# Patient Record
Sex: FEMALE | Race: WHITE | NOT HISPANIC OR LATINO | URBAN - METROPOLITAN AREA
[De-identification: names, ages, dates, MRNs, and addresses within clinical notes are randomized per-mention and may not be internally consistent; named-entity substitution may affect disease eponyms.]

---

## 2017-01-03 ENCOUNTER — HISTORICAL (OUTPATIENT)
Dept: LAB | Facility: HOSPITAL | Age: 32
End: 2017-01-03

## 2017-01-25 ENCOUNTER — HISTORICAL (OUTPATIENT)
Dept: OBSTETRICS AND GYNECOLOGY | Facility: HOSPITAL | Age: 32
End: 2017-01-25

## 2017-07-26 ENCOUNTER — HISTORICAL (OUTPATIENT)
Dept: RADIOLOGY | Facility: HOSPITAL | Age: 32
End: 2017-07-26

## 2018-04-12 ENCOUNTER — HISTORICAL (OUTPATIENT)
Dept: ADMINISTRATIVE | Facility: HOSPITAL | Age: 33
End: 2018-04-12

## 2018-04-12 LAB
ABS NEUT (OLG): 3.3
ALBUMIN SERPL-MCNC: 4.4 GM/DL (ref 3.4–5)
ALBUMIN/GLOB SERPL: 1.57 {RATIO} (ref 1.5–2.5)
ALP SERPL-CCNC: 27 UNIT/L (ref 38–126)
ALT SERPL-CCNC: 11 UNIT/L (ref 7–52)
AST SERPL-CCNC: 18 UNIT/L (ref 15–37)
BILIRUB SERPL-MCNC: 0.5 MG/DL (ref 0.2–1)
BILIRUBIN DIRECT+TOT PNL SERPL-MCNC: 0.1 MG/DL (ref 0–0.5)
BUN SERPL-MCNC: 14 MG/DL (ref 7–18)
CALCIUM SERPL-MCNC: 8.6 MG/DL (ref 8.5–10)
CHLORIDE SERPL-SCNC: 109 MMOL/L (ref 98–107)
CHOLEST SERPL-MCNC: 201 MG/DL (ref 0–200)
CHOLEST/HDLC SERPL: 3.7 {RATIO}
CO2 SERPL-SCNC: 25 MMOL/L (ref 21–32)
CREAT SERPL-MCNC: 0.89 MG/DL (ref 0.6–1.3)
CREAT/UREA NIT SERPL: 15.7
ERYTHROCYTE [DISTWIDTH] IN BLOOD BY AUTOMATED COUNT: 11.7 % (ref 11.5–17)
GGT SERPL-CCNC: 8 UNIT/L (ref 5–85)
GLOBULIN SER-MCNC: 2.8 GM/DL (ref 1.2–3)
GLUCOSE SERPL-MCNC: 82 MG/DL (ref 74–106)
HCT VFR BLD AUTO: 43.3 % (ref 37–47)
HDLC SERPL-MCNC: 54 MG/DL (ref 35–60)
HGB BLD-MCNC: 14.7 GM/DL (ref 12–16)
LDH SERPL-CCNC: 152 UNIT/L (ref 140–271)
LDLC SERPL CALC-MCNC: 125 MG/DL (ref 0–129)
LYMPHOCYTES # BLD AUTO: 1.8 X10(3)/MCL (ref 0.6–3.4)
LYMPHOCYTES NFR BLD AUTO: 33.6 % (ref 13–40)
MCH RBC QN AUTO: 29.9 PG (ref 27–31.2)
MCHC RBC AUTO-ENTMCNC: 34 GM/DL (ref 32–36)
MCV RBC AUTO: 88 FL (ref 80–94)
MONOCYTES # BLD AUTO: 0.4 X10(3)/MCL (ref 0–1.8)
MONOCYTES NFR BLD AUTO: 6.4 % (ref 0.1–24)
NEUTROPHILS NFR BLD AUTO: 60 % (ref 47–80)
PLATELET # BLD AUTO: 172 X10(3)/MCL (ref 130–400)
PMV BLD AUTO: 9.6 FL
POTASSIUM SERPL-SCNC: 4.8 MMOL/L (ref 3.5–5.1)
PROT SERPL-MCNC: 7.2 GM/DL (ref 6.4–8.2)
RBC # BLD AUTO: 4.92 X10(6)/MCL (ref 4.2–5.4)
SODIUM SERPL-SCNC: 140 MMOL/L (ref 136–145)
TRIGL SERPL-MCNC: 41 MG/DL (ref 30–150)
VLDLC SERPL CALC-MCNC: 8.2 MG/DL
WBC # SPEC AUTO: 5.5 X10(3)/MCL (ref 4.5–11.5)

## 2019-02-12 ENCOUNTER — HISTORICAL (OUTPATIENT)
Dept: ADMINISTRATIVE | Facility: HOSPITAL | Age: 34
End: 2019-02-12

## 2019-02-12 LAB
ERYTHROCYTE [DISTWIDTH] IN BLOOD BY AUTOMATED COUNT: 12.1 % (ref 11.5–17)
HCT VFR BLD AUTO: 40 % (ref 37–47)
HGB BLD-MCNC: 13 GM/DL (ref 12–16)
MCH RBC QN AUTO: 28.2 PG (ref 27–31)
MCHC RBC AUTO-ENTMCNC: 32.5 GM/DL (ref 33–36)
MCV RBC AUTO: 86.8 FL (ref 80–94)
PLATELET # BLD AUTO: 191 X10(3)/MCL (ref 130–400)
PMV BLD AUTO: 10.3 FL (ref 9.4–12.4)
RBC # BLD AUTO: 4.61 X10(6)/MCL (ref 4.2–5.4)
WBC # SPEC AUTO: 8.1 X10(3)/MCL (ref 4.5–11.5)

## 2019-03-22 ENCOUNTER — HISTORICAL (OUTPATIENT)
Dept: LAB | Facility: HOSPITAL | Age: 34
End: 2019-03-22

## 2019-03-25 LAB — FINAL CULTURE: NO GROWTH

## 2019-04-04 ENCOUNTER — HISTORICAL (OUTPATIENT)
Dept: RADIOLOGY | Facility: HOSPITAL | Age: 34
End: 2019-04-04

## 2019-04-05 ENCOUNTER — HISTORICAL (OUTPATIENT)
Dept: RADIOLOGY | Facility: HOSPITAL | Age: 34
End: 2019-04-05

## 2019-04-05 LAB — POC CREATININE: 0.7 MG/DL (ref 0.6–1.3)

## 2019-04-10 ENCOUNTER — HISTORICAL (OUTPATIENT)
Dept: ADMINISTRATIVE | Facility: HOSPITAL | Age: 34
End: 2019-04-10

## 2019-04-10 LAB — GRAM STN SPEC: NORMAL

## 2019-04-13 LAB — FINAL CULTURE: NORMAL

## 2019-04-18 ENCOUNTER — HISTORICAL (OUTPATIENT)
Dept: ADMINISTRATIVE | Facility: HOSPITAL | Age: 34
End: 2019-04-18

## 2019-04-18 LAB
ABS NEUT (OLG): 5.4 X10(3)/MCL (ref 2.1–9.2)
ALBUMIN SERPL-MCNC: 3.8 GM/DL (ref 3.4–5)
ALBUMIN/GLOB SERPL: 1.41 {RATIO} (ref 1.5–2.5)
ALP SERPL-CCNC: 39 UNIT/L (ref 38–126)
ALT SERPL-CCNC: 14 UNIT/L (ref 7–52)
AST SERPL-CCNC: 18 UNIT/L (ref 15–37)
BILIRUB SERPL-MCNC: 0.4 MG/DL (ref 0.2–1)
BILIRUBIN DIRECT+TOT PNL SERPL-MCNC: 0.1 MG/DL (ref 0–0.5)
BILIRUBIN DIRECT+TOT PNL SERPL-MCNC: 0.3 MG/DL
BUN SERPL-MCNC: 11 MG/DL (ref 7–18)
CALCIUM SERPL-MCNC: 8.3 MG/DL (ref 8.5–10)
CHLORIDE SERPL-SCNC: 109 MMOL/L (ref 98–107)
CHOLEST SERPL-MCNC: 171 MG/DL (ref 0–200)
CHOLEST/HDLC SERPL: 4 {RATIO}
CO2 SERPL-SCNC: 28 MMOL/L (ref 21–32)
CREAT SERPL-MCNC: 0.7 MG/DL (ref 0.6–1.3)
ERYTHROCYTE [DISTWIDTH] IN BLOOD BY AUTOMATED COUNT: 11.5 % (ref 11.5–17)
GLOBULIN SER-MCNC: 2.6 GM/DL (ref 1.2–3)
GLUCOSE SERPL-MCNC: 87 MG/DL (ref 74–106)
HCT VFR BLD AUTO: 37.1 % (ref 37–47)
HDLC SERPL-MCNC: 43 MG/DL (ref 35–60)
HGB BLD-MCNC: 12.6 GM/DL (ref 12–16)
LDLC SERPL CALC-MCNC: 109 MG/DL (ref 0–129)
LYMPHOCYTES # BLD AUTO: 1.6 X10(3)/MCL (ref 0.6–3.4)
LYMPHOCYTES NFR BLD AUTO: 21.2 % (ref 13–40)
MCH RBC QN AUTO: 29 PG (ref 27–31.2)
MCHC RBC AUTO-ENTMCNC: 34 GM/DL (ref 32–36)
MCV RBC AUTO: 86 FL (ref 80–94)
MONOCYTES # BLD AUTO: 0.4 X10(3)/MCL (ref 0.1–1.3)
MONOCYTES NFR BLD AUTO: 5.6 % (ref 0.1–24)
NEUTROPHILS NFR BLD AUTO: 73.2 % (ref 47–80)
PLATELET # BLD AUTO: 249 X10(3)/MCL (ref 130–400)
PMV BLD AUTO: 9.3 FL (ref 9.4–12.4)
POTASSIUM SERPL-SCNC: 4.4 MMOL/L (ref 3.5–5.1)
PROT SERPL-MCNC: 6.5 GM/DL (ref 6.4–8.2)
RBC # BLD AUTO: 4.34 X10(6)/MCL (ref 4.2–5.4)
SODIUM SERPL-SCNC: 139 MMOL/L (ref 136–145)
TRIGL SERPL-MCNC: 51 MG/DL (ref 30–150)
VLDLC SERPL CALC-MCNC: 10.2 MG/DL
WBC # SPEC AUTO: 7.4 X10(3)/MCL (ref 4.5–11.5)

## 2019-06-20 ENCOUNTER — HISTORICAL (OUTPATIENT)
Dept: RADIOLOGY | Facility: HOSPITAL | Age: 34
End: 2019-06-20

## 2020-05-18 ENCOUNTER — HISTORICAL (OUTPATIENT)
Dept: LAB | Facility: HOSPITAL | Age: 35
End: 2020-05-18

## 2020-07-14 ENCOUNTER — HISTORICAL (OUTPATIENT)
Dept: LAB | Facility: HOSPITAL | Age: 35
End: 2020-07-14

## 2020-07-14 LAB — SARS-COV-2 RNA RESP QL NAA+PROBE: NOT DETECTED

## 2020-08-05 ENCOUNTER — HISTORICAL (OUTPATIENT)
Dept: ADMINISTRATIVE | Facility: HOSPITAL | Age: 35
End: 2020-08-05

## 2020-08-05 LAB
ABS NEUT (OLG): 4.1 X10(3)/MCL (ref 2.1–9.2)
ALBUMIN SERPL-MCNC: 4.6 GM/DL (ref 3.4–5)
ALBUMIN/GLOB SERPL: 1.84 {RATIO} (ref 1.5–2.5)
ALP SERPL-CCNC: 30 UNIT/L (ref 38–126)
ALT SERPL-CCNC: 14 UNIT/L (ref 7–52)
AST SERPL-CCNC: 19 UNIT/L (ref 15–37)
BILIRUB SERPL-MCNC: 0.4 MG/DL (ref 0.2–1)
BILIRUBIN DIRECT+TOT PNL SERPL-MCNC: 0.1 MG/DL (ref 0–0.5)
BILIRUBIN DIRECT+TOT PNL SERPL-MCNC: 0.3 MG/DL
BUN SERPL-MCNC: 16 MG/DL (ref 7–18)
CALCIUM SERPL-MCNC: 9.3 MG/DL (ref 8.5–10)
CHLORIDE SERPL-SCNC: 104 MMOL/L (ref 98–107)
CHOLEST SERPL-MCNC: 193 MG/DL (ref 0–200)
CHOLEST/HDLC SERPL: 3.2 {RATIO}
CO2 SERPL-SCNC: 30 MMOL/L (ref 21–32)
CREAT SERPL-MCNC: 0.71 MG/DL (ref 0.6–1.3)
ERYTHROCYTE [DISTWIDTH] IN BLOOD BY AUTOMATED COUNT: 11.3 % (ref 11.5–17)
GLOBULIN SER-MCNC: 2.5 GM/DL (ref 1.2–3)
GLUCOSE SERPL-MCNC: 87 MG/DL (ref 74–106)
HCT VFR BLD AUTO: 39.9 % (ref 37–47)
HDLC SERPL-MCNC: 60 MG/DL (ref 35–60)
HGB BLD-MCNC: 13.5 GM/DL (ref 12–16)
LDLC SERPL CALC-MCNC: 119 MG/DL (ref 0–129)
LYMPHOCYTES # BLD AUTO: 1.9 X10(3)/MCL (ref 0.6–3.4)
LYMPHOCYTES NFR BLD AUTO: 29.9 % (ref 13–40)
MCH RBC QN AUTO: 29.1 PG (ref 27–31.2)
MCHC RBC AUTO-ENTMCNC: 34 GM/DL (ref 32–36)
MCV RBC AUTO: 86 FL (ref 80–94)
MONOCYTES # BLD AUTO: 0.3 X10(3)/MCL (ref 0.1–1.3)
MONOCYTES NFR BLD AUTO: 5.1 % (ref 0.1–24)
NEUTROPHILS NFR BLD AUTO: 65 % (ref 47–80)
PLATELET # BLD AUTO: 207 X10(3)/MCL (ref 130–400)
PMV BLD AUTO: 10.2 FL (ref 9.4–12.4)
POTASSIUM SERPL-SCNC: 4.2 MMOL/L (ref 3.5–5.1)
PROT SERPL-MCNC: 7.1 GM/DL (ref 6.4–8.2)
RBC # BLD AUTO: 4.64 X10(6)/MCL (ref 4.2–5.4)
SODIUM SERPL-SCNC: 140 MMOL/L (ref 136–145)
TRIGL SERPL-MCNC: 81 MG/DL (ref 30–150)
TSH SERPL-ACNC: 1.44 MIU/ML (ref 0.35–4.94)
VLDLC SERPL CALC-MCNC: 16.2 MG/DL
WBC # SPEC AUTO: 6.3 X10(3)/MCL (ref 4.5–11.5)

## 2020-09-03 ENCOUNTER — HISTORICAL (OUTPATIENT)
Dept: LAB | Facility: HOSPITAL | Age: 35
End: 2020-09-03

## 2020-09-03 LAB — SARS-COV-2 RNA RESP QL NAA+PROBE: NOT DETECTED

## 2020-12-29 ENCOUNTER — HISTORICAL (OUTPATIENT)
Dept: LAB | Facility: HOSPITAL | Age: 35
End: 2020-12-29

## 2020-12-29 LAB — SARS-COV-2 RNA RESP QL NAA+PROBE: NOT DETECTED

## 2021-08-08 ENCOUNTER — HISTORICAL (OUTPATIENT)
Dept: LAB | Facility: HOSPITAL | Age: 36
End: 2021-08-08

## 2021-08-08 LAB — SARS-COV-2 RNA RESP QL NAA+PROBE: NOT DETECTED

## 2021-08-31 ENCOUNTER — HISTORICAL (OUTPATIENT)
Dept: ADMINISTRATIVE | Facility: HOSPITAL | Age: 36
End: 2021-08-31

## 2021-08-31 LAB
ABS NEUT (OLG): 4.1 X10(3)/MCL (ref 2.1–9.2)
ALBUMIN SERPL-MCNC: 4.3 GM/DL (ref 3.4–5)
ALBUMIN/GLOB SERPL: 1.87 {RATIO} (ref 1.5–2.5)
ALP SERPL-CCNC: 22 UNIT/L (ref 38–126)
ALT SERPL-CCNC: 11 UNIT/L (ref 7–52)
AST SERPL-CCNC: 17 UNIT/L (ref 15–37)
BILIRUB SERPL-MCNC: 0.4 MG/DL (ref 0.2–1)
BILIRUBIN DIRECT+TOT PNL SERPL-MCNC: 0.1 MG/DL (ref 0–0.5)
BILIRUBIN DIRECT+TOT PNL SERPL-MCNC: 0.3 MG/DL
BUN SERPL-MCNC: 14 MG/DL (ref 7–18)
CALCIUM SERPL-MCNC: 9 MG/DL (ref 8.5–10)
CHLORIDE SERPL-SCNC: 105 MMOL/L (ref 98–107)
CHOLEST SERPL-MCNC: 183 MG/DL (ref 0–200)
CHOLEST/HDLC SERPL: 3.8 {RATIO}
CO2 SERPL-SCNC: 31 MMOL/L (ref 21–32)
CREAT SERPL-MCNC: 0.74 MG/DL (ref 0.6–1.3)
ERYTHROCYTE [DISTWIDTH] IN BLOOD BY AUTOMATED COUNT: 11.5 % (ref 11.5–17)
GLOBULIN SER-MCNC: 2.3 GM/DL (ref 1.2–3)
GLUCOSE SERPL-MCNC: 88 MG/DL (ref 74–106)
HCT VFR BLD AUTO: 40 % (ref 37–47)
HDLC SERPL-MCNC: 48 MG/DL (ref 35–60)
HGB BLD-MCNC: 13.5 GM/DL (ref 12–16)
LDLC SERPL CALC-MCNC: 109 MG/DL (ref 0–129)
LYMPHOCYTES # BLD AUTO: 1.5 X10(3)/MCL (ref 0.6–3.4)
LYMPHOCYTES NFR BLD AUTO: 25.4 % (ref 13–40)
MCH RBC QN AUTO: 29.1 PG (ref 27–31.2)
MCHC RBC AUTO-ENTMCNC: 34 GM/DL (ref 32–36)
MCV RBC AUTO: 86 FL (ref 80–94)
MONOCYTES # BLD AUTO: 0.3 X10(3)/MCL (ref 0.1–1.3)
MONOCYTES NFR BLD AUTO: 4.7 % (ref 0.1–24)
NEUTROPHILS NFR BLD AUTO: 69.9 % (ref 47–80)
PLATELET # BLD AUTO: 207 X10(3)/MCL (ref 130–400)
PMV BLD AUTO: 10.1 FL (ref 9.4–12.4)
POTASSIUM SERPL-SCNC: 4.5 MMOL/L (ref 3.5–5.1)
PROT SERPL-MCNC: 6.6 GM/DL (ref 6.4–8.2)
RBC # BLD AUTO: 4.64 X10(6)/MCL (ref 4.2–5.4)
SODIUM SERPL-SCNC: 140 MMOL/L (ref 136–145)
TRIGL SERPL-MCNC: 86 MG/DL (ref 30–150)
TSH SERPL-ACNC: 1.45 MIU/ML (ref 0.35–4.94)
VLDLC SERPL CALC-MCNC: 17.2 MG/DL
WBC # SPEC AUTO: 5.9 X10(3)/MCL (ref 4.5–11.5)

## 2022-04-10 ENCOUNTER — HISTORICAL (OUTPATIENT)
Dept: ADMINISTRATIVE | Facility: HOSPITAL | Age: 37
End: 2022-04-10

## 2022-04-26 VITALS
WEIGHT: 157.63 LBS | DIASTOLIC BLOOD PRESSURE: 58 MMHG | HEIGHT: 63 IN | BODY MASS INDEX: 27.93 KG/M2 | SYSTOLIC BLOOD PRESSURE: 106 MMHG

## 2022-05-03 NOTE — HISTORICAL [facility]ERNER
This is a historical note converted from [place]. Formatting and pictures may have been removed.  Please reference [employer] for original formatting and attached multimedia. Chief Complaint  CPX  History of Present Illness  Had a negative COVID swab?a few weeks ago. ?Had a negative COVID antibody test in May.  biking/walking daily  diet is pretty good  up to date with gyn dr carbajal  Review of Systems  ?14 point Review of Systems performed with no exceptions for new complaints other than as noted in HPI.  Physical Exam  Vitals & Measurements  HR:?68(Peripheral)? BP:?102/72?  HT:?160.02?cm? HT:?160.02?cm? WT:?70.300?kg? WT:?70.3?kg? BMI:?27.45?  ?  PHYSICAL EXAM  ?   WDWN patient in NAD, ?AFVSS  HEENT - no acute abnormality  ??????????????? oropharynx WNL  HEART - RRR  LUNGS -? CTA  ABDOMEN - benign, NTND;? no peritoneal signs  EXTREMITIES - No CCE  SKIN - warm, dry, intact  PSYCH - affect appropriate;? alert and oriented  NEURO- no new deficits noted;? cranial nerves grossly intact  ?  Assessment/Plan  1.?Wellness examination?Z00.00  ?continue to work on diet/ exercise   Problem List/Past Medical History  Ongoing  Seasonal allergies  Wellness examination  Historical  Pregnant  Pregnant  Procedure/Surgical History  Adjacent tissue transfer or rearrangement, trunk; defect 10 sq cm or less (04/10/2019)  Excision Mass/Cyst (Right) (04/10/2019)  Excision of cyst, fibroadenoma, or other benign or malignant tumor, aberrant breast tissue, duct lesion, nipple or areolar lesion (except 71794), open, male or female, 1 or more lesions (04/10/2019)  Excision of Right Breast, Open Approach (04/10/2019)  Transfer Chest Subcutaneous Tissue and Fascia, Open Approach (04/10/2019)  Delivery of Products of Conception, External Approach (01/22/2017)  Drainage of Amniotic Fluid, Therapeutic from Products of Conception, Via Natural or Artificial Opening (01/22/2017)  Episiotomy (01/08/2015)  Other manually assisted delivery  (01/08/2015)  WISDOM TEETH (2004)   Medications  No active medications  Allergies  No Known Allergies  Social History  Abuse/Neglect  No, 08/05/2020  Alcohol - Denies Alcohol Use, 01/08/2015  Never, 01/22/2017  Employment/School - High Risk, 01/08/2015  Employed, 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 Living situation: Home/Independent., 04/11/2018  Substance Use - Denies Substance Abuse, 01/08/2015  Never, 01/22/2017  Tobacco - Denies Tobacco Use, 01/08/2015  Never (less than 100 in lifetime), N/A, 08/05/2020  Never (less than 100 in lifetime), N/A, 04/10/2019  Never smoker, 01/22/2017  Family History  Headache: Father.  Seasonal allergic rhinitis: Mother.  Immunizations  Vaccine Date Status Comments   tetanus/diphtheria/pertussis, acel(Tdap) 2017 Recorded    tetanus/diphtheria/pertussis, acel(Tdap) - Not Given Patient Refuses  recieved during pregnancy.   hepatitis B pediatric vaccine 08/27/2002 Recorded    hepatitis B pediatric vaccine 06/25/2002 Recorded    hepatitis B pediatric vaccine 04/10/2002 Recorded    meningococcal conjugate vaccine 01/18/2002 Recorded    poliovirus vaccine, live, trivalent 02/06/1992 Recorded    measles/mumps/rubella virus vaccine 02/06/1992 Recorded    poliovirus vaccine, live, trivalent 04/14/1988 Recorded    measles/mumps/rubella virus vaccine 04/14/1988 Recorded    poliovirus vaccine, live, trivalent 01/27/1987 Recorded    poliovirus vaccine, live, trivalent 1986 Recorded    Health Maintenance  Health Maintenance  ???Pending?(in the next year)  ??? ??OverDue  ??? ? ? ?Alcohol Misuse Screening due??01/02/20??and every 1??year(s)  ??? ??Due?  ??? ? ? ?ADL Screening due??08/05/20??and every 1??year(s)  ??? ? ? ?Depression Screening due??08/05/20??and every?  ??? ? ? ?Influenza Vaccine due??08/05/20??and every?  ??? ??Near Due?  ??? ? ? ?TB Skin Test near due??08/22/20??and every 1??year(s)  ??? ??Due In Future?  ??? ?  ? ?Obesity Screening not due until??01/01/21??and every 1??year(s)  ???Satisfied?(in the past 1 year)  ??? ??Satisfied?  ??? ? ? ?Blood Pressure Screening on??08/05/20.??Satisfied by Leeanne Calderon MA  ??? ? ? ?Body Mass Index Check on??08/05/20.??Satisfied by Leeanne Calderon MA  ??? ? ? ?Cervical Cancer Screening on??09/03/19.??Satisfied by cleveland Sifuentes  ??? ? ? ?Obesity Screening on??08/05/20.??Satisfied by Leeanne Calderon MA  ??? ? ? ?TB Skin Test on??08/22/19.  ?

## 2022-05-03 NOTE — HISTORICAL [facility]
This is a historical note converted from [employer]. Formatting and pictures may have been removed.  Please reference [employer] for original formatting and attached multimedia. Chief Complaint  CPX  History of Present Illness  dx with arachnoid cyst? after an isolated excruciating headache in august 2015;  redid a scan last year and no change;  Review of Systems  No new complaints except as explained HPI  ?  Physical Exam  Vitals & Measurements  HR:?80(Peripheral)? BP:?108/68?  HT:?160.02?cm? HT:?160.02?cm? WT:?66.67?kg? WT:?66.67?kg? BMI:?26.04?  ?  PHYSICAL EXAM  ?   WDWN patient in NAD, ?AFVSS  HEENT - no acute abnormality  ??????????????? oropharynx WNL  HEART - RRR  LUNGS -? CTA  ABDOMEN - benign, NTND;? no peritoneal signs  EXTREMITIES - No CCE  SKIN - warm, dry, intact  PSYCH - affect appropriate;? alert and oriented  NEURO- no new deficits noted;? cranial nerves grossly intact  ?  Assessment/Plan  1.?Wellness examination  up to date  Ordered:  CBC w/ Auto Diff, Routine collect, 04/12/18 9:56:00 CDT, Blood, Order for future visit, Stop date 04/12/18 9:56:00 CDT, Lab Collect, Wellness examination  Seasonal allergies, 04/12/18 9:56:00 CDT  Clinic Follow up, *Est. 04/12/19 3:00:00 CDT, PLEASE MAKE THIS A 30 MINUTE PHYSICAL, Order for future visit, Wellness examination  Seasonal allergies, [facility]  CMP, Routine collect, 04/12/18 9:56:00 CDT, Blood, Order for future visit, Stop date 04/12/18 9:56:00 CDT, Lab Collect, Wellness examination  Seasonal allergies, 04/12/18 9:56:00 CDT  Lab Collection Request, 04/12/18 9:56:00 CDT, [facility] AMB - AFP, 04/12/18 9:56:00 CDT  Lipid Panel, Routine collect, 04/12/18 9:56:00 CDT, Blood, Order for future visit, Stop date 04/12/18 9:56:00 CDT, Lab Collect, Wellness examination  Seasonal allergies, 04/12/18 9:56:00 CDT  Preventative Health Care Est 18-39 years [id number] PC, Wellness examination  Seasonal allergies, [facility] AM[facility] - AFP, 04/12/18 9:56:00 CDT  ?  2.?Seasonal  allergies  currently ok  Ordered:  CBC w/ Auto Diff, Routine collect, 04/12/18 9:56:00 CDT, Blood, Order for future visit, Stop date 04/12/18 9:56:00 CDT, Lab Collect, Wellness examination  Seasonal allergies, 04/12/18 9:56:00 CDT  Clinic Follow up, *Est. 04/12/19 3:00:00 CDT, PLEASE MAKE THIS A 30 MINUTE PHYSICAL, Order for future visit, Wellness examination  Seasonal allergies, [facility]  CMP, Routine collect, 04/12/18 9:56:00 CDT, Blood, Order for future visit, Stop date 04/12/18 9:56:00 CDT, Lab Collect, Wellness examination  Seasonal allergies, 04/12/18 9:56:00 CDT  Lab Collection Request, 04/12/18 9:56:00 CDT, [facility] AMB - AFP, 04/12/18 9:56:00 CDT  Lipid Panel, Routine collect, 04/12/18 9:56:00 CDT, Blood, Order for future visit, Stop date 04/12/18 9:56:00 CDT, Lab Collect, Wellness examination  Seasonal allergies, 04/12/18 9:56:00 CDT  Preventative Health Care Est 18-39 years [id number] PC, Wellness examination  Seasonal allergies, [facility] AM[facility] - AFP, 04/12/18 9:56:00 CDT  ?   Problem List/Past Medical History  Ongoing  Seasonal allergies  Wellness examination  Historical  Pregnant  Pregnant  Procedure/Surgical History  Delivery of Products of Conception, External Approach (01/22/2017), Drainage of Amniotic Fluid, Therapeutic from Products of Conception, Via Natural or Artificial Opening (01/22/2017), Episiotomy (01/08/2015), Other manually assisted delivery (01/08/2015), WISDOM TEETH (2004).  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 Living situation: Home/Independent., 04/11/2018  Substance Abuse - Denies Substance Abuse, 01/08/2015  Never, 01/22/2017  Tobacco - Denies Tobacco Use, 01/08/2015  Never smoker, 01/22/2017  Family  History  Headache: Father.  Seasonal allergic rhinitis: Mother.  Immunizations  Vaccine Date Status Comments   tetanus/diphtheria/pertussis, acel(Tdap) - Not Given Patient Refuses  recieved during pregnancy.

## 2022-05-03 NOTE — HISTORICAL [facility]
This is a historical note converted from [place]. Formatting and pictures may have been removed.  Please reference [employer]akan for original formatting and attached multimedia. Chief Complaint  wellness  History of Present Illness  exercises regularly,? diet is pretty good;? up to date with gyn;  recently had an abscess in right breast - drained by dr lewis  no new c/o  feeling well  ?  Review of Systems  ?14 point Review of Systems performed with no exceptions for new complaints other than as noted in HPI.  Physical Exam  Vitals & Measurements  HR:?80(Peripheral)? BP:?112/60?  HT:?160.02?cm? WT:?66.7?kg? BMI:?26.05?  ?  PHYSICAL EXAM  ?   Well developed, well nourished, NAD, alert and oriented x4, very?pleasant  Vitals reviewed  Head: NCAT  Eyes: , conjunctiva WNL,  Ears: TMs and EACs clear  Nose: Mucosa WNL, No discharge,  O/P: No erythema or exudate  Neck: supple, , no LA, no thyromegaly,  CV: RRR no MRG,  Pulmonary: Effort normal and breath sounds normal, no wheeze  Abdomen: soft, NTND, no HSM;???  Musculoskeletal:? no tenderness, joints wnl for acute changes, , no CCE  Skin: warm, dry, intact  Neuro: no motor/sensory deficits noted ,? cranial nerves grossly intact,  Psychiatric: Cooperative, appropriate mood and affect, appears to have normal judgment  ?  ?  ?  ?  Assessment/Plan  1.?Wellness examination?Z00.00  ?doing well, continue diet/exercise, she would like annual labs  RTC 1 year  ?   Problem List/Past Medical History  Ongoing  Seasonal allergies  Wellness examination  Historical  Pregnant  Pregnant  Procedure/Surgical History  Adjacent tissue transfer or rearrangement, trunk; defect 10 sq cm or less (04/10/2019)  Excision Mass/Cyst (Right) (04/10/2019)  Excision of cyst, fibroadenoma, or other benign or malignant tumor, aberrant breast tissue, duct lesion, nipple or areolar lesion (except 81462), open, male or female, 1 or more lesions (04/10/2019)  Excision of Right Breast, Open Approach  (04/10/2019)  Transfer Chest Subcutaneous Tissue and Fascia, Open Approach (04/10/2019)  Delivery of Products of Conception, External Approach (01/22/2017)  Drainage of Amniotic Fluid, Therapeutic from Products of Conception, Via Natural or Artificial Opening (01/22/2017)  Episiotomy (01/08/2015)  Other manually assisted delivery (01/08/2015)  WISDOM TEETH (2004)   Medications  No active medications  Allergies  No Known Allergies  Social History  Alcohol - Denies Alcohol Use, 01/08/2015  Never, 01/22/2017  Employment/School - High Risk, 01/08/2015  Employed, 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 Living situation: Home/Independent., 04/11/2018  Substance Abuse - Denies Substance Abuse, 01/08/2015  Never, 01/22/2017  Tobacco - Denies Tobacco Use, 01/08/2015  Never (less than 100 in lifetime), N/A, 04/10/2019  Never smoker, 01/22/2017  Family History  Headache: Father.  Seasonal allergic rhinitis: Mother.  Immunizations  Vaccine Date Status Comments   tetanus/diphtheria/pertussis, acel(Tdap) - Not Given Patient Refuses  recieved during pregnancy.   Health Maintenance  Health Maintenance  ???Pending?(in the next year)  ??? ??Due?  ??? ? ? ?ADL Screening due??04/19/19??and every 1??year(s)  ??? ? ? ?Alcohol Misuse Screening due??04/19/19??and every 1??year(s)  ??? ? ? ?Depression Screening due??04/19/19??and every?  ??? ? ? ?TB Skin Test due??04/19/19??Variable frequency  ??? ? ? ?Tetanus Vaccine due??04/19/19??and every 10??year(s)  ??? ??Due In Future?  ??? ? ? ?Blood Pressure Screening not due until??04/17/20??and every 1??year(s)  ??? ? ? ?Body Mass Index Check not due until??04/17/20??and every 1??year(s)  ??? ? ? ?Obesity Screening not due until??04/18/20??and every 1??year(s)  ???Satisfied?(in the past 1 year)  ??? ??Satisfied?  ??? ? ? ?Blood Pressure Screening on??04/18/19.??Satisfied by Lily De Los Santos LPN  ??? ? ? ?Body Mass Index Check  on??04/18/19.??Satisfied by Lily De Los Santos LPN  ??? ? ? ?Cervical Cancer Screening on??08/20/18.??Satisfied by Kristin Murrell  ??? ? ? ?Obesity Screening on??04/18/19.??Satisfied by Lily De Los Santos LPN  ?  ?

## 2023-06-16 PROCEDURE — 87389 HIV-1 AG W/HIV-1&-2 AB AG IA: CPT | Performed by: FAMILY MEDICINE

## 2023-06-16 PROCEDURE — 86803 HEPATITIS C AB TEST: CPT | Performed by: FAMILY MEDICINE

## 2025-07-30 NOTE — HISTORICAL [facility]
This is a historical note converted from [employer]akan. Formatting and pictures may have been removed.  Please reference [place]akan for original formatting and attached multimedia. Chief Complaint  wellness  History of Present Illness  35-year-old  female presents office today for annual wellness examination.? Up-to-date with health measures preventive screenings. ?Due for wellness labs today. ?Sees Dr. Fields annually for GYN examinations.? Has upcoming?scheduled appointment in September?for next GYN examination/visit.? Completed and underwent mammogram screening?this year. ?This was?done due to a history of a?mass in the breast which transition to an abscess?in the years past.? Recent mammogram?grossly unremarkable.? Both parents healthy living. ?/monogamous relationship with spouse.? 2 healthy children?age 6 and 4.? Employed as a registered nurse in labor and delivery.? Denies nicotine/tobacco use. ?Denies illicit drug use.? Alcohol intake is reported as minimal/occasional/social.? Reports?regular cardiovascular exercise/physical activity?including but not limited to some resistance training as well as?cardio running/jogging etc.?up-to-date with dental exams?and cleanings.? Up-to-date with Tdap vaccination. ?Has received both Covid immunizations.  Review of Systems  Except as noted above has no new complaints regarding:  Constitutional:?no weight gain,?no weight loss,?no fatigue,?no fever,?no chills,?no weakness.  Eyes:?no vision loss/changes,??no pain,?no double vision,??  Head:?no headache,?no head injury,?no neck pain.?  Neck:??no lumps,?no swollen glands,?no stiffness.  Ears:??no ringing,?no earache,?no drainage.?  Nose:?no stuffiness,?no discharge,?no itching,no postnasal drip, ?no nosebleeds,?no sinus pain.  Throat:?no dry mouth,?no sore throat,?no hoarseness,?no thrush,?no non-healing sores.  Cardiovascular:?no chest pain or discomfort,?no tightness,?no palpitations,?no SOB with activity,?no  difficulty breathing while supine,?no swelling,?no sudden awakening from sleep with SOB.  Vascular:?no calf pain with walking,?no leg cramping.  Respiratory:??no cough,?no sputum,?no coughing up blood,?no SOB,?no wheezing,?no painful breathing.  Gastrointestinal:?no swallowing difficulty,?no heartburn,?no change in appetite,?no nausea,?no change in bowel habits,?no rectal bleeding,?no constipation,?no diarrhea,?no yellow eyes or skin.  Urinary:?no frequency,?no urgency,?no burning or pain,?no blood in urine,?no incontinence,?no change in urinary strength.  Musculoskeletal:?no muscle or joint pain,?no stiffness,?,?no redness of joints,?no swelling of joints,?no trauma.  Skin:?no rashes,?no lumps,?no itching,?no dryness,??no hair or nail changes.  Neurologic:?no dizziness,?no fainting,?no seizures,?no weakness,?no numbness,?no tingling,?no tremors.  Psychiatric:?no nervousness,??no depression,?no memory loss.  Endocrine:?no heat or cold intolerance,??no frequent urination,?no thirst,?no change in appetite.  Hematologic:?no ease of bruising,?no ease of bleeding.  ?  Physical Exam  Vitals & Measurements  HR:?80(Peripheral)? BP:?106/58?  HT:?160.02?cm? HT:?160.02?cm? WT:?71.5?kg? WT:?71.500?kg? BMI:?27.92?  Well developed, well nourished, NAD, alert and oriented x4  Vitals reviewed  Head: NCAT  Eyes: EOMI, conjunctiva WNL, pupils symmetric  Ears: TMs and EACs clear  Nose: Mucosa WNL, No discharge, No sinus tenderness  O/P: No erythema or exudate  Neck: supple, FROM, no LA, no thyromegaly, no bruits auscultated  CV: RRR no MRG, peripheral pulses intact  Pulmonary: Effort normal and breath sounds normal, no wheeze  Abdomen: soft, NTND, no HSM; ? NABS; no peritoneal signs ? ??  Musculoskeletal: ?no tenderness, joints wnl for acute changes, FROM, no CCE  Skin: warm, dry, intact  Neuro: no motor/sensory deficits, cranial nerves grossly intact, cerebellar function appears intact  Lymphadenopathy: no abnormalities  noted  Psychiatric: Cooperative, appropriate mood and affect, appears to have normal judgment  ?  Assessment/Plan  1.?Wellness examination?Z00.00  ?Up-to-date with health needs preventive screenings. ?Up-to-date with vaccination screenings.? Wellness labs today.? Continue?3-4 days of 30-45 minutes sessions of brisk walking/water aerobics/resistance training as tolerating. ?Recommend limiting excess simple carbohydrates from daily dietary regimen. recommend increase lean protein and vegetable matter.  Ordered:  CBC w/ Auto Diff, Routine collect, 08/31/21 9:03:00 CDT, Blood, Order for future visit, Stop date 08/31/21 9:03:00 CDT, Lab Collect, Wellness examination, 08/31/21 9:03:00 CDT  Clinic Follow-Up Wellness, *Est. 08/31/22 3:00:00 CDT, Order for future visit, Wellness examination, [facility]  Comprehensive Metabolic Panel, Routine collect, 08/31/21 9:03:00 CDT, Blood, Order for future visit, Stop date 08/31/21 9:03:00 CDT, Lab Collect, Wellness examination, 08/31/21 9:03:00 CDT  Lab Collection Request, 08/31/21 9:03:00 CDT, [facility] AMB - AFP, 08/31/21 9:03:00 CDT, Wellness examination  Lipid Panel, Routine collect, 08/31/21 9:03:00 CDT, Blood, Order for future visit, Stop date 08/31/21 9:03:00 CDT, Lab Collect, Wellness examination, 08/31/21 9:03:00 CDT  Preventative Health Care Est 18-39 years [id number] PC, Wellness examination, [facility] AM[facility] - AFP, 08/31/21 9:04:00 CDT  Thyroid Stimulating Hormone, Routine collect, 08/31/21 9:04:00 CDT, Blood, Order for future visit, Stop date 08/31/21 9:04:00 CDT, Lab Collect, Wellness examination, 08/31/21 9:04:00 CDT  ?  Return to clinic in 12 months for wellness exam  Referrals  Clinic Follow-Up Wellness, *Est. 08/31/22 3:00:00 CDT, Order for future visit, Wellness examination, [facility]   Problem List/Past Medical History  Ongoing  Seasonal allergies  Wellness examination  Historical  Pregnant  Pregnant  Procedure/Surgical History  Adjacent tissue transfer or rearrangement, trunk;  defect 10 sq cm or less (04/10/2019)  Excision Mass/Cyst (Right) (04/10/2019)  Excision of cyst, fibroadenoma, or other benign or malignant tumor, aberrant breast tissue, duct lesion, nipple or areolar lesion (except 87117), open, male or female, 1 or more lesions (04/10/2019)  Excision of Right Breast, Open Approach (04/10/2019)  Transfer Chest Subcutaneous Tissue and Fascia, Open Approach (04/10/2019)  Delivery of Products of Conception, External Approach (01/22/2017)  Drainage of Amniotic Fluid, Therapeutic from Products of Conception, Via Natural or Artificial Opening (01/22/2017)  Episiotomy (01/08/2015)  Other manually assisted delivery (01/08/2015)  WISDOM TEETH (2004)   Medications  No active medications  Allergies  No Known Allergies  Social History  Abuse/Neglect  No, 08/31/2021  Alcohol - Denies Alcohol Use, 01/08/2015  Never, 01/22/2017  Employment/School - High Risk, 01/08/2015  Employed, 08/31/2021  Home/Environment  Lives with Children, Spouse. Living situation: Home/Independent., 04/11/2018  Substance Use - Denies Substance Abuse, 01/08/2015  Never, 01/22/2017  Tobacco - Denies Tobacco Use, 01/08/2015  Never (less than 100 in lifetime), N/A, 08/31/2021  Family History  Headache: Father.  Seasonal allergic rhinitis: Mother.  Immunizations  Vaccine Date Status Comments   COVID-19 MRNA, LNP-S, PF- Pfizer 03/24/2021 Given    COVID-19 MRNA, LNP-S, PF- Pfizer 03/03/2021 Given    tetanus/diphtheria/pertussis, acel(Tdap) 2017 Recorded    tetanus/diphtheria/pertussis, acel(Tdap) - Not Given Patient Refuses  recieved during pregnancy.   hepatitis B pediatric vaccine 08/27/2002 Recorded    hepatitis B pediatric vaccine 06/25/2002 Recorded    hepatitis B pediatric vaccine 04/10/2002 Recorded    meningococcal conjugate vaccine 01/18/2002 Recorded    poliovirus vaccine, live, trivalent 02/06/1992 Recorded    measles/mumps/rubella virus vaccine 02/06/1992 Recorded    poliovirus vaccine, live, trivalent 04/14/1988  Recorded    measles/mumps/rubella virus vaccine 04/14/1988 Recorded    poliovirus vaccine, live, trivalent 01/27/1987 Recorded    poliovirus vaccine, live, trivalent 1986 Recorded    Health Maintenance  Health Maintenance  ???Pending?(in the next year)  ??? ??OverDue  ??? ? ? ?TB Skin Test due??08/22/20??and every 1??year(s)  ??? ? ? ?Alcohol Misuse Screening due??01/02/21??and every 1??year(s)  ??? ??Due?  ??? ? ? ?ADL Screening due??08/31/21??and every 1??year(s)  ??? ? ? ?Depression Screening due??08/31/21??Unknown Frequency  ??? ??Due In Future?  ??? ? ? ?Obesity Screening not due until??01/01/22??and every 1??year(s)  ???Satisfied?(in the past 1 year)  ??? ??Satisfied?  ??? ? ? ?Blood Pressure Screening on??08/31/21.??Satisfied by Lily De Los Santos LPN  ??? ? ? ?Body Mass Index Check on??08/31/21.??Satisfied by Lily De Los Santos LPN  ??? ? ? ?Cervical Cancer Screening on??09/24/20.??Satisfied by Austin Gutiérrez  ??? ? ? ?Obesity Screening on??08/31/21.??Satisfied by Lily De Los Santos LPN  ?      Physician?was in the building when patient was?seen and examined by the nurse practitioner.? I concur with the?assessment/plan/management?of the patient.   30-Jul-2025 21:53